# Patient Record
Sex: FEMALE | Race: ASIAN
[De-identification: names, ages, dates, MRNs, and addresses within clinical notes are randomized per-mention and may not be internally consistent; named-entity substitution may affect disease eponyms.]

---

## 2020-03-17 ENCOUNTER — HOSPITAL ENCOUNTER (OUTPATIENT)
Dept: HOSPITAL 72 - PAN | Age: 50
Discharge: HOME | End: 2020-03-17
Payer: MEDICAID

## 2020-03-17 VITALS — WEIGHT: 156 LBS | BODY MASS INDEX: 29.45 KG/M2 | HEIGHT: 61 IN

## 2020-03-17 VITALS — DIASTOLIC BLOOD PRESSURE: 79 MMHG | SYSTOLIC BLOOD PRESSURE: 117 MMHG

## 2020-03-17 DIAGNOSIS — Z90.49: ICD-10-CM

## 2020-03-17 DIAGNOSIS — K62.5: Primary | ICD-10-CM

## 2020-03-17 NOTE — CONSULTATION
DATE OF CONSULTATION:  03/17/2020

CONSULTING PHYSICIAN:  Tamir Garza M.D.



CHIEF COMPLAINT:  Rectal bleeding.



HISTORY OF PRESENT ILLNESS:  This is a 49-year-old female with past medical

history of asthma, history of anemia, gallstones, presents to us for

complaint of rectal bleeding and anemia.  No prior history of endoscopy.

No colonoscopy.



PAST MEDICAL HISTORY:

1. Asthma.

2. Anemia.

3. Hemorrhoids.

4. Gallstones.



PAST SURGICAL HISTORY:  Tubal ligation, _____, cholecystectomy,

hemorrhoidectomy.



MEDICATIONS:  Please see medication reconciliation list.



ALLERGIES:  To sulfa, penicillin, tetracycline, and Cipro.



FAMILY HISTORY:  Aunt had ovarian cancer.



SOCIAL HISTORY:  The patient denies any tobacco, alcohol, or IV drug

abuse.



REVIEW OF SYSTEMS:  Positive for rectal bleeding.  Otherwise, no weight

loss.  No change in bowel habits.



PHYSICAL EXAMINATION:

VITAL SIGNS:  Temperature 97.6, blood pressure is 117/79, pulse 60,

respirations 20.

HEENT:  Normocephalic and atraumatic.  Sclerae anicteric.

NECK:  Supple.  No evidence of obvious lymphadenopathy.

CARDIOVASCULAR:  Regular rhythm.  Plus S1, S2.

LUNGS:  Clear to auscultation bilaterally.

ABDOMEN:  Positive bowel sounds.  Soft and nontender.  No rebound.  No

guarding.  No peritoneal sign.

EXTREMITIES:  No cyanosis.  No clubbing.  No edema.



ASSESSMENT AND PLAN:  This is a 49-year-old female with rectal bleeding.

Given her age of 49, given this persistent rectal bleeding, we will

recommend the patient to have a colonoscopy done.  The patient was given

instruction for colonoscopy, risks and benefits of the procedure were

explained to her, the prep was explained to her.  She agreed.  We will

plan to schedule as soon as she gets authorization.









  ______________________________________________

  Tamir Garza M.D.





DR:  LAMIN

D:  03/17/2020 10:35

T:  03/18/2020 00:31

JOB#:  [id number]

CC: